# Patient Record
Sex: FEMALE | Race: WHITE | NOT HISPANIC OR LATINO | Employment: OTHER | URBAN - METROPOLITAN AREA
[De-identification: names, ages, dates, MRNs, and addresses within clinical notes are randomized per-mention and may not be internally consistent; named-entity substitution may affect disease eponyms.]

---

## 2023-11-30 ENCOUNTER — HOSPITAL ENCOUNTER (EMERGENCY)
Facility: CLINIC | Age: 73
Discharge: HOME | End: 2023-11-30
Attending: EMERGENCY MEDICINE | Admitting: EMERGENCY MEDICINE
Payer: COMMERCIAL

## 2023-11-30 ENCOUNTER — APPOINTMENT (OUTPATIENT)
Dept: CT IMAGING | Facility: CLINIC | Age: 73
End: 2023-11-30
Attending: EMERGENCY MEDICINE
Payer: COMMERCIAL

## 2023-11-30 VITALS
HEART RATE: 116 BPM | TEMPERATURE: 97.8 F | SYSTOLIC BLOOD PRESSURE: 149 MMHG | OXYGEN SATURATION: 94 % | RESPIRATION RATE: 26 BRPM | WEIGHT: 217.15 LBS | DIASTOLIC BLOOD PRESSURE: 97 MMHG

## 2023-11-30 DIAGNOSIS — S00.03XA CONTUSION OF SCALP, INITIAL ENCOUNTER: ICD-10-CM
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
## 2023-11-30 LAB
ANION GAP SERPL CALCULATED.3IONS-SCNC: 12 MMOL/L (ref 7–15)
BASOPHILS # BLD AUTO: 0.1 10E3/UL (ref 0–0.2)
BASOPHILS NFR BLD AUTO: 1 %
BUN SERPL-MCNC: 13.7 MG/DL (ref 8–23)
CALCIUM SERPL-MCNC: 9.5 MG/DL (ref 8.8–10.2)
CHLORIDE SERPL-SCNC: 98 MMOL/L (ref 98–107)
CREAT SERPL-MCNC: 0.59 MG/DL (ref 0.51–0.95)
DEPRECATED HCO3 PLAS-SCNC: 26 MMOL/L (ref 22–29)
EGFRCR SERPLBLD CKD-EPI 2021: >90 ML/MIN/1.73M2
EOSINOPHIL # BLD AUTO: 0.2 10E3/UL (ref 0–0.7)
EOSINOPHIL NFR BLD AUTO: 3 %
ERYTHROCYTE [DISTWIDTH] IN BLOOD BY AUTOMATED COUNT: 14.2 % (ref 10–15)
GLUCOSE SERPL-MCNC: 144 MG/DL (ref 70–99)
HCT VFR BLD AUTO: 41 % (ref 35–47)
HGB BLD-MCNC: 13.4 G/DL (ref 11.7–15.7)
HOLD SPECIMEN: NORMAL
HOLD SPECIMEN: NORMAL
IMM GRANULOCYTES # BLD: 0.1 10E3/UL
IMM GRANULOCYTES NFR BLD: 1 %
LYMPHOCYTES # BLD AUTO: 2.2 10E3/UL (ref 0.8–5.3)
LYMPHOCYTES NFR BLD AUTO: 28 %
MCH RBC QN AUTO: 28.2 PG (ref 26.5–33)
MCHC RBC AUTO-ENTMCNC: 32.7 G/DL (ref 31.5–36.5)
MCV RBC AUTO: 86 FL (ref 78–100)
MONOCYTES # BLD AUTO: 0.7 10E3/UL (ref 0–1.3)
MONOCYTES NFR BLD AUTO: 8 %
NEUTROPHILS # BLD AUTO: 4.8 10E3/UL (ref 1.6–8.3)
NEUTROPHILS NFR BLD AUTO: 59 %
NRBC # BLD AUTO: 0 10E3/UL
NRBC BLD AUTO-RTO: 0 /100
PLATELET # BLD AUTO: 341 10E3/UL (ref 150–450)
POTASSIUM SERPL-SCNC: 3.9 MMOL/L (ref 3.4–5.3)
RBC # BLD AUTO: 4.76 10E6/UL (ref 3.8–5.2)
SODIUM SERPL-SCNC: 136 MMOL/L (ref 135–145)
WBC # BLD AUTO: 7.9 10E3/UL (ref 4–11)

## 2023-11-30 PROCEDURE — 36415 COLL VENOUS BLD VENIPUNCTURE: CPT | Performed by: EMERGENCY MEDICINE

## 2023-11-30 PROCEDURE — 85025 COMPLETE CBC W/AUTO DIFF WBC: CPT | Performed by: EMERGENCY MEDICINE

## 2023-11-30 PROCEDURE — 70450 CT HEAD/BRAIN W/O DYE: CPT

## 2023-11-30 PROCEDURE — 99284 EMERGENCY DEPT VISIT MOD MDM: CPT | Mod: 25

## 2023-11-30 PROCEDURE — 80048 BASIC METABOLIC PNL TOTAL CA: CPT | Performed by: EMERGENCY MEDICINE

## 2023-11-30 ASSESSMENT — ACTIVITIES OF DAILY LIVING (ADL): ADLS_ACUITY_SCORE: 35

## 2023-11-30 NOTE — ED PROVIDER NOTES
History     Chief Complaint:  Fall and Head Injury       HPI   Nayely Butler is a 73 year old female who presents to the ED after a fall.  The patient was out with her girlfriends tonight and notes that she had 4 glasses of wine.  She came home and had gone to bed.  She woke up around 130 to go to the bathroom but fell hitting her head.  She thinks she hit it on a dresser.  She denies loss of consciousness.  She does not take any blood thinners.  The patient states that she does have restless legs and often gets up in middle of the night.  Last night she was trying gabapentin as a replacement for the ropinirole that she had been on for quite some time as she felt that her leg was more stiff with ropinirole.  She states the gabapentin made her feel very unsteady and otherwise did not like the way it made her feel so she will go back to ropinirole.    Separately, she notes longstanding exertional shortness of breath since she had COVID last year.  She is going through a work-up for this with her primary care clinic.      Independent Historian:    Spouse/Partner - They report and corroborate the above HPI    Review of External Notes:  There are no prior notes available in [facility] or Care Everywhere    Allergies:  No Known Allergies     Physical Exam   Patient Vitals for the past 24 hrs:   BP Temp Temp src Pulse Resp SpO2 Weight   11/30/23 0122 -- -- -- 116 26 -- --   11/30/23 0121 -- -- -- 115 19 94 % --   11/30/23 0120 -- -- -- 115 17 94 % --   11/30/23 0117 -- -- -- 118 15 91 % --   11/30/23 0116 -- -- -- 112 21 92 % --   11/30/23 0115 (!) 149/97 -- -- 114 15 93 % --   11/30/23 0114 -- -- -- 111 18 92 % --   11/30/23 0113 -- -- -- 116 17 93 % --   11/30/23 0112 -- -- -- 118 21 94 % --   11/30/23 0039 -- -- -- -- (!) 32 92 % --   11/30/23 0038 -- -- -- -- 23 93 % --   11/30/23 0037 -- -- -- 111 26 94 % --   11/30/23 0036 -- -- -- 111 21 93 % --   11/30/23 0035 -- -- -- -- 21 97 % --   11/30/23 0034 -- -- -- -- -- 96 %  --   11/30/23 0033 -- -- -- -- -- 95 % --   11/30/23 0032 (!) 187/94 -- -- 110 -- -- --   11/30/23 0021 -- -- -- -- -- -- 98.5 kg (217 lb 2.5 oz)   11/30/23 0013 (!) 223/121 97.8  F (36.6  C) Temporal 109 20 95 % --        Physical Exam  Constitutional: Vital signs reviewed as above.   Head: No external signs of trauma noted.  Eyes: Pupils are equal, round, and reactive to light.   Neck: No JVD noted  Cardiovascular: tachycardic rate, Regular rhythm and normal heart sounds.  No murmur heard. Equal B/L peripheral pulses.  Pulmonary/Chest: Effort normal and breath sounds normal. No respiratory distress. Patient has no wheezes. Patient has no rales.   Gastrointestinal: Soft. There is no tenderness.   Musculoskeletal/Extremities: No pitting edema noted. Normal tone.  Neurological: Patient is alert and oriented to person, place, and time. Stable gait. No gross neuro deficits.  Skin: Skin is warm and dry. There is no diaphoresis noted.   Psychiatric: The patient appears calm.      Emergency Department Course       Laboratory: Imaging:   Labs Ordered and Resulted from Time of ED Arrival to Time of ED Departure   BASIC METABOLIC PANEL - Abnormal       Result Value    Sodium 136      Potassium 3.9      Chloride 98      Carbon Dioxide (CO2) 26      Anion Gap 12      Urea Nitrogen 13.7      Creatinine 0.59      GFR Estimate >90      Calcium 9.5      Glucose 144 (*)    CBC WITH PLATELETS AND DIFFERENTIAL    WBC Count 7.9      RBC Count 4.76      Hemoglobin 13.4      Hematocrit 41.0      MCV 86      MCH 28.2      MCHC 32.7      RDW 14.2      Platelet Count 341      % Neutrophils 59      % Lymphocytes 28      % Monocytes 8      % Eosinophils 3      % Basophils 1      % Immature Granulocytes 1      NRBCs per 100 WBC 0      Absolute Neutrophils 4.8      Absolute Lymphocytes 2.2      Absolute Monocytes 0.7      Absolute Eosinophils 0.2      Absolute Basophils 0.1      Absolute Immature Granulocytes 0.1      Absolute NRBCs 0.0        Head CT w/o contrast   Final Result   IMPRESSION:   1.  Large subgaleal hematoma in the posterior right parietal region. No calvarial fracture or acute intracranial hemorrhage.   2.  Underlying mild volume loss and presumed chronic small vessel ischemic changes.              Procedures       Emergency Department Course & Assessments:             Interventions:  Medications - No data to display     Assessments, Independent Interpretation, Consult/Discussion of ManagementTests:  ED Course as of 11/30/23 0131   Thu Nov 30, 2023   0110 I evaluated the patient.       Social Determinants of Health affecting care:  None    Disposition:  The patient was discharged to home.     Impression & Plan    CMS Diagnoses: None    Code Status: No Order    Medical Decision Making:    This 73 year old female presents to the ED due to Fall and Head Injury  . Please see the HPI and exam for specifics. A broad differential was considered including, but not limited to, skull fracture, intracranial hemorrhage, electrolyte dysfunction/dehydration, etc.    Based on the differential, exam, and any decision tools, the above workup was undertaken. Lab and/or imaging results were reviewed by me and are notable for reassuring labs, and a head CT that does not demonstrate skull fracture nor intracranial hemorrhage.    The patient remained somewhat tachycardic in the emergency department.  She states that this is not uncommon and she has had periods of her life where she is tachycardic.  The patient's mouth also seems somewhat dry.  She asked for oral rehydration.  She also takes diuretic medications which could be a contributing factor, though I think that her outing with her girlfriends and alcohol consumption likely is a large  of the dryness in the mouth and the tachycardia now.    The patient was quite hypertensive when she came in and that has improved spontaneously.    Ultimately, I felt that she could be discharged.  I will encourage  close primary care follow-up as the patient had other questions related to sleep and her restless legs.  She is going to stop her gabapentin and restart her ropinirole.  The description of symptoms to me of racing thoughts when the patient wakes up in the middle of the night does seem like anxiety and this should also be considered by her primary care clinic.    Critical Care:  None.    Diagnosis:    ICD-10-CM    1. Fall, initial encounter  W19.XXXA       2. Contusion of scalp, initial encounter  S00.03XA            Discharge Medications:  New Prescriptions    No medications on file          11/30/2023   Vinay Wilkerson, Vinay Langley,   11/30/23 0131

## 2023-11-30 NOTE — ED TRIAGE NOTES
Patient arrives with  for evaluation of fall and head injury. Patient reports she was out with friends tonight and she had some wine. Patient came home and went to sleep, got up to bathroom at approximately 0130 when she fell, hitting head. Patient reports hitting right side of head on the dresser. Patient denies LOC or blood thinner use. Large hematoma noted to R side of head. /121 in triage.      Triage Assessment (Adult)       Row Name 11/30/23 0014          Triage Assessment    Airway WDL WDL        Respiratory WDL    Respiratory WDL WDL        Skin Circulation/Temperature WDL    Skin Circulation/Temperature WDL WDL        Cardiac WDL    Cardiac WDL X  HTN        Peripheral/Neurovascular WDL    Peripheral Neurovascular WDL WDL        Cognitive/Neuro/Behavioral WDL    Cognitive/Neuro/Behavioral WDL WDL

## 2023-11-30 NOTE — DISCHARGE INSTRUCTIONS
What do you do next:   Continue your home medications unless we have specifically changed them  Stay hydrated and avoid alcohol.  If medications were prescribed today, take these as directed.  You can use over-the-counter acetaminophen (Tylenol ) and ibuprofen for fever or pain control as applicable to your visit today.  Acetaminophen (Tylenol): Take 500 to 1000 mg by mouth every 6 hours as needed for fever or pain.  Do not take more than 4000 total milligrams of acetaminophen-containing products in a 24-hour timeframe.  Ibuprofen: Take 600 milligrams by mouth every 6-8 hours as needed for fever or pain.  Take this with food or milk to avoid stomach upset.  Follow up as indicated below    When do you return: If you have uncontrollable pain, intractable vomiting, focal numbness/weakness of your face/arms/legs, trouble walking or speaking, or any other symptoms that concern you, please return to the ED for reevaluation.    Thank you for allowing us to care for you today.

## 2024-03-17 ENCOUNTER — HEALTH MAINTENANCE LETTER (OUTPATIENT)
Age: 74
End: 2024-03-17

## 2025-03-22 ENCOUNTER — HEALTH MAINTENANCE LETTER (OUTPATIENT)
Age: 75
End: 2025-03-22